# Patient Record
Sex: FEMALE | Race: WHITE | NOT HISPANIC OR LATINO | URBAN - METROPOLITAN AREA
[De-identification: names, ages, dates, MRNs, and addresses within clinical notes are randomized per-mention and may not be internally consistent; named-entity substitution may affect disease eponyms.]

---

## 2017-09-05 ENCOUNTER — IMPORTED ENCOUNTER (OUTPATIENT)
Dept: URBAN - METROPOLITAN AREA CLINIC 50 | Facility: CLINIC | Age: 82
End: 2017-09-05

## 2017-10-30 ENCOUNTER — IMPORTED ENCOUNTER (OUTPATIENT)
Dept: URBAN - METROPOLITAN AREA CLINIC 50 | Facility: CLINIC | Age: 82
End: 2017-10-30

## 2017-12-06 ENCOUNTER — IMPORTED ENCOUNTER (OUTPATIENT)
Dept: URBAN - METROPOLITAN AREA CLINIC 50 | Facility: CLINIC | Age: 82
End: 2017-12-06

## 2018-04-30 ENCOUNTER — IMPORTED ENCOUNTER (OUTPATIENT)
Dept: URBAN - METROPOLITAN AREA CLINIC 50 | Facility: CLINIC | Age: 83
End: 2018-04-30

## 2018-11-05 ENCOUNTER — IMPORTED ENCOUNTER (OUTPATIENT)
Dept: URBAN - METROPOLITAN AREA CLINIC 50 | Facility: CLINIC | Age: 83
End: 2018-11-05

## 2019-05-13 ENCOUNTER — IMPORTED ENCOUNTER (OUTPATIENT)
Dept: URBAN - METROPOLITAN AREA CLINIC 50 | Facility: CLINIC | Age: 84
End: 2019-05-13

## 2019-06-12 NOTE — PATIENT DISCUSSION
DERMATOCHALASIS / PTOSIS RUL AND CHIDI :  VISUALLY SIGNIFICANT TO PATIENT. SCHEDULE WITH OCULOPLASTIC SPECIALIST IF PATIENT DESIRES.

## 2019-11-11 ENCOUNTER — IMPORTED ENCOUNTER (OUTPATIENT)
Dept: URBAN - METROPOLITAN AREA CLINIC 50 | Facility: CLINIC | Age: 84
End: 2019-11-11

## 2020-09-28 ENCOUNTER — IMPORTED ENCOUNTER (OUTPATIENT)
Dept: URBAN - METROPOLITAN AREA CLINIC 50 | Facility: CLINIC | Age: 85
End: 2020-09-28

## 2021-01-20 NOTE — PATIENT DISCUSSION
Epiretinal Membrane Counseling: The diagnosis and natural history of epiretinal membrane was discussed with the patient including the risk of progression with retinal traction resulting in visual distortion. Patient instructed on how to do [address] to check for distortion in vision, The patient is instructed to call back immediately if any vision changes, and keep follow up as scheduled.

## 2021-03-12 NOTE — PATIENT DISCUSSION
PLAQUENIL HVF TODAY WITH PARACENTRAL DEFECTS OU - SUSPICIOUS OF TOXICITY. OK TO CONTINUE MEDICATION AS PRESCRIBED FOR NOW. RTC 1 MONTH FOR REPEAT HVF - IF DEFECT REPEATABLE WILL CONTACT RHEUMATOLOGIST TO DISCUSS POSSIBLE TOXICITY/RECOMMEND DISCONTINUING MEDICATION.

## 2021-03-29 ENCOUNTER — IMPORTED ENCOUNTER (OUTPATIENT)
Dept: URBAN - METROPOLITAN AREA CLINIC 50 | Facility: CLINIC | Age: 86
End: 2021-03-29

## 2021-03-29 NOTE — PATIENT DISCUSSION
"""Follow dry ARMD without treatment. MVI/AREDS/[facility]. Patient to call if vision changes or distortion increases.  Good diet/do not smoke."" ""Start AREDS by mouth once a day

## 2021-04-18 ASSESSMENT — TONOMETRY
OD_IOP_MMHG: 15
OD_IOP_MMHG: 14
OD_IOP_MMHG: 15
OS_IOP_MMHG: 13
OS_IOP_MMHG: 16
OS_IOP_MMHG: 13
OS_IOP_MMHG: 18
OD_IOP_MMHG: 16
OS_IOP_MMHG: 15
OD_IOP_MMHG: 13
OS_IOP_MMHG: 14
OD_IOP_MMHG: 13
OD_IOP_MMHG: 17
OS_IOP_MMHG: 16

## 2021-04-18 ASSESSMENT — VISUAL ACUITY
OS_CC: 20/30-1
OD_CC: 20/30+2
OS_CC: 20/50
OS_CC: 20/50
OS_CC: 20/30-2
OS_CC: J1@ 14 IN
OS_CC: 20/40
OS_OTHER: >20/400.
OS_CC: J1+/-
OD_BAT: >20/400
OS_CC: 20/50+2
OS_BAT: >20/400
OD_CC: 20/25-2
OD_OTHER: >20/400.
OD_CC: J1+@ 14 IN
OS_BAT: >20/400
OD_CC: 20/30-2
OS_PH: 20/40
OD_CC: J1+
OD_CC: 20/30-2
OD_BAT: >20/400
OS_OTHER: >20/400.
OS_CC: J2@ 14 IN
OD_CC: J1+/-
OD_CC: J1+@ 12 IN
OS_CC: 20/50
OD_CC: 20/25-
OD_CC: J1+@ 15 IN
OD_CC: J1@ 14 IN
OS_CC: J1+
OS_CC: J1+@ 14 IN
OD_CC: J2@ 14 IN
OS_CC: J1+@ 15 IN
OD_OTHER: >20/400.
OD_CC: 20/30-
OS_CC: J1+@ 12 IN
OS_PH: 20/50
OD_CC: 20/25

## 2021-04-30 NOTE — PATIENT DISCUSSION
PLAQUENIL HVF TODAY WITH REPEATABLE PARACENTRAL DEFECTS OD, NON-SPECIFIC DEFECTS OS. DISCUSSED WITH DR. JIMENEZ. RECOMMEND DISCONTINUING PLAQUENIL DUE TO EARLY SIGNS OF TOXICITY - WILL CONTACT PATIENT'S RHEUMATOLOGIST. FOLLOW-UP AS DIRECTED. MONITOR.

## 2021-09-21 ENCOUNTER — PREPPED CHART (OUTPATIENT)
Dept: URBAN - METROPOLITAN AREA CLINIC 52 | Facility: CLINIC | Age: 86
End: 2021-09-21

## 2021-09-27 ENCOUNTER — COMPREHENSIVE EXAM (OUTPATIENT)
Dept: URBAN - METROPOLITAN AREA CLINIC 52 | Facility: CLINIC | Age: 86
End: 2021-09-27

## 2021-09-27 DIAGNOSIS — H35.373: ICD-10-CM

## 2021-09-27 DIAGNOSIS — H35.3132: ICD-10-CM

## 2021-09-27 DIAGNOSIS — H43.813: ICD-10-CM

## 2021-09-27 PROCEDURE — 92014 COMPRE OPH EXAM EST PT 1/>: CPT

## 2021-09-27 PROCEDURE — 92134 CPTRZ OPH DX IMG PST SGM RTA: CPT

## 2021-09-27 ASSESSMENT — VISUAL ACUITY
OU_CC: J2
OS_CC: 20/60
OD_CC: 20/40

## 2021-09-27 ASSESSMENT — TONOMETRY
OS_IOP_MMHG: 13
OD_IOP_MMHG: 12

## 2022-04-04 NOTE — PATIENT DISCUSSION
HVF today with slight progression of paracentral defect OD (worsened PSD), stable paracentral defect OS. 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 Called patient to discuss. Patient reports she discontinued Plaquenil as discussed after HVF last year, but then restarted 200mg QD (instead of BID) due to inflammation and COPD 08/2021 - both Dr. Davon Shafer and COPD doctor aware. Discussed in detail that we recommend discontinuing Plaquenil due to signs of toxicity on testing (HVF and OCT) and new diagnosis of Dry ARMD. Patient will call rheumatologist to discuss with him. Discussed that if patient/other doctors decide to continue medication against medical advice recommend testing q6months instead of q1 year. Patient will call back after speaking with rheumatologist. Monitor.

## 2022-04-19 ENCOUNTER — ESTABLISHED PATIENT (OUTPATIENT)
Dept: URBAN - METROPOLITAN AREA CLINIC 52 | Facility: CLINIC | Age: 87
End: 2022-04-19

## 2022-04-19 DIAGNOSIS — H43.813: ICD-10-CM

## 2022-04-19 DIAGNOSIS — H35.3132: ICD-10-CM

## 2022-04-19 DIAGNOSIS — H04.123: ICD-10-CM

## 2022-04-19 PROCEDURE — 92134 CPTRZ OPH DX IMG PST SGM RTA: CPT

## 2022-04-19 PROCEDURE — 92014 COMPRE OPH EXAM EST PT 1/>: CPT

## 2022-04-19 ASSESSMENT — VISUAL ACUITY
OD_CC: 20/50-2
OU_CC: J2
OS_CC: 20/50-2

## 2022-04-19 ASSESSMENT — TONOMETRY
OS_IOP_MMHG: 11
OD_IOP_MMHG: 11

## 2022-04-19 NOTE — PATIENT DISCUSSION
Drop regiment discussed with patient. Start PF tears OU 2-3x/day or as needed. Start warm compresses OU BID. Start lid scrubs OU BID.

## 2022-04-19 NOTE — PATIENT DISCUSSION
Glasses Rx given to patient. Patient preferred increased Add power. Patient does a lot of near work with puzzles and is aware of closer working distance with increased add power. Educated patient on the importance of good lighting while reading.

## 2022-09-26 ENCOUNTER — COMPREHENSIVE EXAM (OUTPATIENT)
Dept: URBAN - METROPOLITAN AREA CLINIC 52 | Facility: CLINIC | Age: 87
End: 2022-09-26

## 2022-09-26 DIAGNOSIS — H35.373: ICD-10-CM

## 2022-09-26 DIAGNOSIS — H43.813: ICD-10-CM

## 2022-09-26 DIAGNOSIS — H35.3132: ICD-10-CM

## 2022-09-26 PROCEDURE — 92134 CPTRZ OPH DX IMG PST SGM RTA: CPT

## 2022-09-26 PROCEDURE — 92014 COMPRE OPH EXAM EST PT 1/>: CPT

## 2022-09-26 ASSESSMENT — KERATOMETRY
OS_AXISANGLE2_DEGREES: 26
OD_K2POWER_DIOPTERS: 48.25
OD_AXISANGLE2_DEGREES: 174
OS_K2POWER_DIOPTERS: 48.25
OD_AXISANGLE_DEGREES: 084
OS_AXISANGLE_DEGREES: 116
OD_K1POWER_DIOPTERS: 46.50
OS_K1POWER_DIOPTERS: 46.50

## 2022-09-26 ASSESSMENT — VISUAL ACUITY
OD_GLARE: 20/50-1
OD_CC: 20/50
OD_GLARE: 20/60-1
OS_GLARE: 20/60-1
OU_CC: J2"14IN
OS_CC: 20/70
OS_GLARE: 20/70-1

## 2022-09-26 ASSESSMENT — TONOMETRY
OS_IOP_MMHG: 12
OD_IOP_MMHG: 10

## 2022-09-26 NOTE — PATIENT DISCUSSION
Recommended patient consult with Dr. Caesar Barraza ([id number] [facility] Specialist) to further treat patient's vision.

## 2023-04-17 ENCOUNTER — ESTABLISHED PATIENT (OUTPATIENT)
Dept: URBAN - METROPOLITAN AREA CLINIC 52 | Facility: CLINIC | Age: 88
End: 2023-04-17

## 2023-04-17 DIAGNOSIS — H40.052: ICD-10-CM

## 2023-04-17 PROCEDURE — 76514 ECHO EXAM OF EYE THICKNESS: CPT

## 2023-04-17 PROCEDURE — 92012 INTRM OPH EXAM EST PATIENT: CPT

## 2023-04-17 RX ORDER — LATANOPROST 50 UG/ML
1 SOLUTION/ DROPS OPHTHALMIC EVERY EVENING
Start: 2023-04-17

## 2023-04-17 ASSESSMENT — KERATOMETRY
OS_AXISANGLE2_DEGREES: 26
OS_K1POWER_DIOPTERS: 46.50
OD_AXISANGLE_DEGREES: 084
OS_K2POWER_DIOPTERS: 48.25
OD_AXISANGLE2_DEGREES: 174
OD_K2POWER_DIOPTERS: 48.25
OS_AXISANGLE_DEGREES: 116
OD_K1POWER_DIOPTERS: 46.50

## 2023-04-17 ASSESSMENT — VISUAL ACUITY
OU_CC: 20/70-1
OS_CC: 20/200
OD_CC: 20/70

## 2023-04-17 ASSESSMENT — PACHYMETRY
OS_CT_UM: 582
OD_CT_UM: 525

## 2023-04-17 ASSESSMENT — TONOMETRY
OS_IOP_MMHG: 44
OD_IOP_MMHG: 16
OS_IOP_MMHG: 41
OD_IOP_MMHG: 17
OS_IOP_MMHG: 54

## 2023-04-18 ENCOUNTER — FOLLOW UP (OUTPATIENT)
Dept: URBAN - METROPOLITAN AREA CLINIC 52 | Facility: CLINIC | Age: 88
End: 2023-04-18

## 2023-04-18 DIAGNOSIS — H40.052: ICD-10-CM

## 2023-04-18 PROCEDURE — 92012 INTRM OPH EXAM EST PATIENT: CPT

## 2023-04-18 RX ORDER — PREDNISOLONE ACETATE 10 MG/ML
1 SUSPENSION/ DROPS OPHTHALMIC
Start: 2023-04-18

## 2023-04-18 ASSESSMENT — KERATOMETRY
OS_AXISANGLE_DEGREES: 116
OD_AXISANGLE_DEGREES: 084
OD_K1POWER_DIOPTERS: 46.50
OS_K2POWER_DIOPTERS: 48.25
OS_AXISANGLE2_DEGREES: 26
OS_K1POWER_DIOPTERS: 46.50
OD_K2POWER_DIOPTERS: 48.25
OD_AXISANGLE2_DEGREES: 174

## 2023-04-18 ASSESSMENT — TONOMETRY
OS_IOP_MMHG: 8
OS_IOP_MMHG: 08
OS_IOP_MMHG: 05
OD_IOP_MMHG: 15
OD_IOP_MMHG: 15
OS_IOP_MMHG: 5

## 2023-04-18 ASSESSMENT — VISUAL ACUITY
OS_CC: 20/200
OD_CC: 20/70-2

## 2023-04-19 ENCOUNTER — FOLLOW UP (OUTPATIENT)
Dept: URBAN - METROPOLITAN AREA CLINIC 52 | Facility: CLINIC | Age: 88
End: 2023-04-19

## 2023-04-19 DIAGNOSIS — H20.012: ICD-10-CM

## 2023-04-19 DIAGNOSIS — H40.052: ICD-10-CM

## 2023-04-19 PROCEDURE — 92012 INTRM OPH EXAM EST PATIENT: CPT

## 2023-04-19 ASSESSMENT — KERATOMETRY
OS_AXISANGLE_DEGREES: 116
OS_AXISANGLE2_DEGREES: 26
OD_AXISANGLE_DEGREES: 084
OD_AXISANGLE2_DEGREES: 174
OS_K1POWER_DIOPTERS: 46.50
OD_K1POWER_DIOPTERS: 46.50
OS_K2POWER_DIOPTERS: 48.25
OD_K2POWER_DIOPTERS: 48.25

## 2023-04-19 ASSESSMENT — TONOMETRY
OD_IOP_MMHG: 21
OD_IOP_MMHG: 20
OS_IOP_MMHG: 20
OS_IOP_MMHG: 17

## 2023-04-19 ASSESSMENT — VISUAL ACUITY
OS_CC: 20/200
OU_CC: 20/70
OD_CC: 20/70

## 2023-04-24 ENCOUNTER — FOLLOW UP (OUTPATIENT)
Dept: URBAN - METROPOLITAN AREA CLINIC 52 | Facility: CLINIC | Age: 88
End: 2023-04-24

## 2023-04-24 DIAGNOSIS — H20.012: ICD-10-CM

## 2023-04-24 PROCEDURE — 92012 INTRM OPH EXAM EST PATIENT: CPT

## 2023-04-24 ASSESSMENT — KERATOMETRY
OD_AXISANGLE_DEGREES: 084
OD_AXISANGLE2_DEGREES: 174
OS_AXISANGLE2_DEGREES: 26
OS_AXISANGLE_DEGREES: 116
OS_K2POWER_DIOPTERS: 48.25
OD_K2POWER_DIOPTERS: 48.25
OD_K1POWER_DIOPTERS: 46.50
OS_K1POWER_DIOPTERS: 46.50

## 2023-04-24 ASSESSMENT — VISUAL ACUITY
OS_CC: 20/100+1
OU_CC: 20/50-1
OD_CC: 20/50-1

## 2023-04-24 ASSESSMENT — TONOMETRY
OD_IOP_MMHG: 17
OD_IOP_MMHG: 18
OS_IOP_MMHG: 14
OS_IOP_MMHG: 17

## 2023-05-08 ENCOUNTER — FOLLOW UP (OUTPATIENT)
Dept: URBAN - METROPOLITAN AREA CLINIC 52 | Facility: CLINIC | Age: 88
End: 2023-05-08

## 2023-05-08 DIAGNOSIS — H40.052: ICD-10-CM

## 2023-05-08 DIAGNOSIS — H20.012: ICD-10-CM

## 2023-05-08 PROCEDURE — 92012 INTRM OPH EXAM EST PATIENT: CPT

## 2023-05-08 ASSESSMENT — TONOMETRY
OD_IOP_MMHG: 21
OS_IOP_MMHG: 17
OS_IOP_MMHG: 20
OD_IOP_MMHG: 20

## 2023-05-08 ASSESSMENT — VISUAL ACUITY
OS_CC: 20/80-1
OD_CC: 20/80

## 2023-05-08 ASSESSMENT — KERATOMETRY
OD_AXISANGLE_DEGREES: 084
OS_AXISANGLE2_DEGREES: 26
OS_K1POWER_DIOPTERS: 46.50
OS_K2POWER_DIOPTERS: 48.25
OS_AXISANGLE_DEGREES: 116
OD_K1POWER_DIOPTERS: 46.50
OD_K2POWER_DIOPTERS: 48.25
OD_AXISANGLE2_DEGREES: 174

## 2023-09-15 NOTE — PATIENT DISCUSSION
Discussed starting AREDS2 supplements, BP Control, UV protection and dark leafy green vegetables. - - -